# Patient Record
Sex: FEMALE | Race: ASIAN
[De-identification: names, ages, dates, MRNs, and addresses within clinical notes are randomized per-mention and may not be internally consistent; named-entity substitution may affect disease eponyms.]

---

## 2021-06-13 DIAGNOSIS — Z01.818 ENCOUNTER FOR OTHER PREPROCEDURAL EXAMINATION: ICD-10-CM

## 2021-06-13 PROBLEM — Z00.00 ENCOUNTER FOR PREVENTIVE HEALTH EXAMINATION: Status: ACTIVE | Noted: 2021-06-13

## 2021-06-14 ENCOUNTER — APPOINTMENT (OUTPATIENT)
Dept: DISASTER EMERGENCY | Facility: CLINIC | Age: 76
End: 2021-06-14

## 2021-06-15 LAB — SARS-COV-2 N GENE NPH QL NAA+PROBE: NOT DETECTED

## 2021-06-16 RX ORDER — ONDANSETRON 8 MG/1
4 TABLET, FILM COATED ORAL ONCE
Refills: 0 | Status: DISCONTINUED | OUTPATIENT
Start: 2021-06-17 | End: 2021-06-17

## 2021-06-16 RX ORDER — FENTANYL CITRATE 50 UG/ML
25 INJECTION INTRAVENOUS
Refills: 0 | Status: DISCONTINUED | OUTPATIENT
Start: 2021-06-17 | End: 2021-06-17

## 2021-06-16 RX ORDER — SODIUM CHLORIDE 9 MG/ML
1000 INJECTION, SOLUTION INTRAVENOUS
Refills: 0 | Status: DISCONTINUED | OUTPATIENT
Start: 2021-06-17 | End: 2021-06-17

## 2021-06-17 ENCOUNTER — RESULT REVIEW (OUTPATIENT)
Age: 76
End: 2021-06-17

## 2021-06-17 ENCOUNTER — OUTPATIENT (OUTPATIENT)
Dept: INPATIENT UNIT | Facility: HOSPITAL | Age: 76
LOS: 1 days | Discharge: HOME | End: 2021-06-17
Payer: MEDICARE

## 2021-06-17 VITALS
TEMPERATURE: 97 F | SYSTOLIC BLOOD PRESSURE: 157 MMHG | HEIGHT: 62 IN | DIASTOLIC BLOOD PRESSURE: 67 MMHG | WEIGHT: 117.95 LBS | OXYGEN SATURATION: 98 % | RESPIRATION RATE: 16 BRPM | HEART RATE: 64 BPM

## 2021-06-17 VITALS
RESPIRATION RATE: 16 BRPM | DIASTOLIC BLOOD PRESSURE: 68 MMHG | HEART RATE: 56 BPM | SYSTOLIC BLOOD PRESSURE: 154 MMHG | OXYGEN SATURATION: 99 %

## 2021-06-17 DIAGNOSIS — I10 ESSENTIAL (PRIMARY) HYPERTENSION: ICD-10-CM

## 2021-06-17 DIAGNOSIS — E78.5 HYPERLIPIDEMIA, UNSPECIFIED: ICD-10-CM

## 2021-06-17 DIAGNOSIS — E55.9 VITAMIN D DEFICIENCY, UNSPECIFIED: ICD-10-CM

## 2021-06-17 DIAGNOSIS — M81.0 AGE-RELATED OSTEOPOROSIS WITHOUT CURRENT PATHOLOGICAL FRACTURE: ICD-10-CM

## 2021-06-17 DIAGNOSIS — Z90.710 ACQUIRED ABSENCE OF BOTH CERVIX AND UTERUS: Chronic | ICD-10-CM

## 2021-06-17 DIAGNOSIS — Z91.040 LATEX ALLERGY STATUS: ICD-10-CM

## 2021-06-17 DIAGNOSIS — Z98.891 HISTORY OF UTERINE SCAR FROM PREVIOUS SURGERY: Chronic | ICD-10-CM

## 2021-06-17 DIAGNOSIS — C34.11 MALIGNANT NEOPLASM OF UPPER LOBE, RIGHT BRONCHUS OR LUNG: ICD-10-CM

## 2021-06-17 DIAGNOSIS — J47.9 BRONCHIECTASIS, UNCOMPLICATED: ICD-10-CM

## 2021-06-17 DIAGNOSIS — I08.3 COMBINED RHEUMATIC DISORDERS OF MITRAL, AORTIC AND TRICUSPID VALVES: ICD-10-CM

## 2021-06-17 DIAGNOSIS — J42 UNSPECIFIED CHRONIC BRONCHITIS: ICD-10-CM

## 2021-06-17 DIAGNOSIS — Z90.711 ACQUIRED ABSENCE OF UTERUS WITH REMAINING CERVICAL STUMP: ICD-10-CM

## 2021-06-17 DIAGNOSIS — Z98.890 OTHER SPECIFIED POSTPROCEDURAL STATES: Chronic | ICD-10-CM

## 2021-06-17 DIAGNOSIS — Z79.82 LONG TERM (CURRENT) USE OF ASPIRIN: ICD-10-CM

## 2021-06-17 DIAGNOSIS — Z98.49 CATARACT EXTRACTION STATUS, UNSPECIFIED EYE: Chronic | ICD-10-CM

## 2021-06-17 DIAGNOSIS — Z79.899 OTHER LONG TERM (CURRENT) DRUG THERAPY: ICD-10-CM

## 2021-06-17 PROCEDURE — 87102 FUNGUS ISOLATION CULTURE: CPT

## 2021-06-17 PROCEDURE — 88108 CYTOPATH CONCENTRATE TECH: CPT | Mod: 26

## 2021-06-17 PROCEDURE — 88104 CYTOPATH FL NONGYN SMEARS: CPT | Mod: 26,59

## 2021-06-17 PROCEDURE — 87015 SPECIMEN INFECT AGNT CONCNTJ: CPT

## 2021-06-17 PROCEDURE — 87252 VIRUS INOCULATION TISSUE: CPT

## 2021-06-17 PROCEDURE — 89051 BODY FLUID CELL COUNT: CPT

## 2021-06-17 PROCEDURE — 88305 TISSUE EXAM BY PATHOLOGIST: CPT

## 2021-06-17 PROCEDURE — 88104 CYTOPATH FL NONGYN SMEARS: CPT

## 2021-06-17 PROCEDURE — 87070 CULTURE OTHR SPECIMN AEROBIC: CPT

## 2021-06-17 PROCEDURE — C9399: CPT

## 2021-06-17 PROCEDURE — 88108 CYTOPATH CONCENTRATE TECH: CPT

## 2021-06-17 PROCEDURE — 87206 SMEAR FLUORESCENT/ACID STAI: CPT

## 2021-06-17 PROCEDURE — 88305 TISSUE EXAM BY PATHOLOGIST: CPT | Mod: 26

## 2021-06-17 PROCEDURE — 87116 MYCOBACTERIA CULTURE: CPT

## 2021-06-17 RX ORDER — FAMOTIDINE 10 MG/ML
20 INJECTION INTRAVENOUS ONCE
Refills: 0 | Status: COMPLETED | OUTPATIENT
Start: 2021-06-17 | End: 2021-06-17

## 2021-06-17 RX ORDER — ROSUVASTATIN CALCIUM 5 MG/1
1 TABLET ORAL
Qty: 0 | Refills: 0 | DISCHARGE

## 2021-06-17 RX ORDER — ASPIRIN/CALCIUM CARB/MAGNESIUM 324 MG
0 TABLET ORAL
Qty: 0 | Refills: 0 | DISCHARGE

## 2021-06-17 RX ORDER — METOPROLOL TARTRATE 50 MG
1 TABLET ORAL
Qty: 0 | Refills: 0 | DISCHARGE

## 2021-06-17 RX ORDER — METOCLOPRAMIDE HCL 10 MG
10 TABLET ORAL ONCE
Refills: 0 | Status: COMPLETED | OUTPATIENT
Start: 2021-06-17 | End: 2021-06-17

## 2021-06-17 RX ADMIN — Medication 10 MILLIGRAM(S): at 06:50

## 2021-06-17 RX ADMIN — FAMOTIDINE 20 MILLIGRAM(S): 10 INJECTION INTRAVENOUS at 06:50

## 2021-06-17 RX ADMIN — SODIUM CHLORIDE 75 MILLILITER(S): 9 INJECTION, SOLUTION INTRAVENOUS at 08:28

## 2021-06-17 NOTE — BRIEF OPERATIVE NOTE - NSICDXBRIEFPREOP_GEN_ALL_CORE_FT
PRE-OP DIAGNOSIS:  Adenocarcinoma, lung 17-Jun-2021 08:28:15  Jonah Helton  Chronic bronchiolitis 17-Jun-2021 08:28:23  Jonah Helton

## 2021-06-17 NOTE — BRIEF OPERATIVE NOTE - OPERATION/FINDINGS
Bronchoscopy inserted through ETT. Kinsey was sharp. No endbronchial lesions seen within the view of the scope. Left subsegments and right subsegments visualized. BAL performed in the RUL posterior segment with brushing

## 2021-06-17 NOTE — BRIEF OPERATIVE NOTE - NSICDXBRIEFPROCEDURE_GEN_ALL_CORE_FT
PROCEDURES:  Bronchoscopy, with BAL 17-Jun-2021 08:27:49 Brushing was required in the RUL-posterior segment Jonah Helton

## 2021-06-17 NOTE — ASU PATIENT PROFILE, ADULT - PSH
H/O abdominal hysterectomy  2000   H/O abdominal hysterectomy    H/O cataract extraction    bilateral  H/O eye surgery    H/O:  section

## 2022-12-06 ENCOUNTER — OFFICE (OUTPATIENT)
Dept: URBAN - METROPOLITAN AREA CLINIC 94 | Facility: CLINIC | Age: 77
Setting detail: OPHTHALMOLOGY
End: 2022-12-06
Payer: MEDICARE

## 2022-12-06 DIAGNOSIS — H44.23: ICD-10-CM

## 2022-12-06 DIAGNOSIS — H35.3121: ICD-10-CM

## 2022-12-06 DIAGNOSIS — H35.3212: ICD-10-CM

## 2022-12-06 PROCEDURE — 92012 INTRM OPH EXAM EST PATIENT: CPT | Performed by: OPHTHALMOLOGY

## 2022-12-06 PROCEDURE — 92235 FLUORESCEIN ANGRPH MLTIFRAME: CPT | Performed by: OPHTHALMOLOGY

## 2022-12-06 PROCEDURE — 92134 CPTRZ OPH DX IMG PST SGM RTA: CPT | Performed by: OPHTHALMOLOGY

## 2022-12-06 ASSESSMENT — KERATOMETRY
OD_AXISANGLE_DEGREES: 017
OS_AXISANGLE_DEGREES: 166
OD_K1POWER_DIOPTERS: 44.75
OS_K2POWER_DIOPTERS: 45.00
METHOD_AUTO_MANUAL: AUTO
OS_K1POWER_DIOPTERS: 44.50
OD_K2POWER_DIOPTERS: 46.25

## 2022-12-06 ASSESSMENT — PACHYMETRY
OD_CT_CORRECTION: 2
OS_CT_CORRECTION: 1
OD_CT_UM: 514
OS_CT_UM: 535

## 2022-12-06 ASSESSMENT — REFRACTION_AUTOREFRACTION
OD_SPHERE: -0.75
OS_SPHERE: -0.25
OS_CYLINDER: -1.50
OS_AXIS: 086
OD_AXIS: 085
OD_CYLINDER: -0.75

## 2022-12-06 ASSESSMENT — TONOMETRY
OD_IOP_MMHG: 21
OS_IOP_MMHG: 19

## 2022-12-06 ASSESSMENT — CONFRONTATIONAL VISUAL FIELD TEST (CVF)
OS_FINDINGS: FULL
OD_FINDINGS: FULL

## 2022-12-06 ASSESSMENT — AXIALLENGTH_DERIVED
OS_AL: 23.5226
OD_AL: 23.2993

## 2022-12-06 ASSESSMENT — VISUAL ACUITY
OS_BCVA: 20/80-1
OD_BCVA: 20/30-1

## 2022-12-06 ASSESSMENT — SPHEQUIV_DERIVED
OD_SPHEQUIV: -1.125
OS_SPHEQUIV: -1

## 2022-12-21 ENCOUNTER — OFFICE (OUTPATIENT)
Dept: URBAN - METROPOLITAN AREA CLINIC 94 | Facility: CLINIC | Age: 77
Setting detail: OPHTHALMOLOGY
End: 2022-12-21
Payer: MEDICARE

## 2022-12-21 DIAGNOSIS — Z96.1: ICD-10-CM

## 2022-12-21 DIAGNOSIS — H40.013: ICD-10-CM

## 2022-12-21 PROBLEM — H52.7 REFRACTIVE ERROR ; BOTH EYES: Status: ACTIVE | Noted: 2022-12-06

## 2022-12-21 PROCEDURE — 92014 COMPRE OPH EXAM EST PT 1/>: CPT | Performed by: OPHTHALMOLOGY

## 2022-12-21 PROCEDURE — 92250 FUNDUS PHOTOGRAPHY W/I&R: CPT | Performed by: OPHTHALMOLOGY

## 2022-12-21 ASSESSMENT — REFRACTION_AUTOREFRACTION
OD_CYLINDER: -1.50
OS_SPHERE: +0.50
OD_AXIS: 091
OS_AXIS: 087
OS_CYLINDER: -2.25
OD_SPHERE: -0.50

## 2022-12-21 ASSESSMENT — VISUAL ACUITY
OS_BCVA: 20/60-
OD_BCVA: 20/25

## 2022-12-21 ASSESSMENT — KERATOMETRY
OS_K2POWER_DIOPTERS: 45.25
OD_K2POWER_DIOPTERS: 45.75
OS_AXISANGLE_DEGREES: 173
OD_K1POWER_DIOPTERS: 44.50
METHOD_AUTO_MANUAL: AUTO
OD_AXISANGLE_DEGREES: 177
OS_K1POWER_DIOPTERS: 44.00

## 2022-12-21 ASSESSMENT — TONOMETRY
OS_IOP_MMHG: 15
OD_IOP_MMHG: 15

## 2022-12-21 ASSESSMENT — PACHYMETRY
OS_CT_CORRECTION: 1
OD_CT_CORRECTION: 2
OS_CT_UM: 535
OD_CT_UM: 514

## 2022-12-21 ASSESSMENT — SPHEQUIV_DERIVED
OD_SPHEQUIV: -1.25
OS_SPHEQUIV: -0.625

## 2022-12-21 ASSESSMENT — CONFRONTATIONAL VISUAL FIELD TEST (CVF)
OD_FINDINGS: FULL
OS_FINDINGS: FULL

## 2022-12-21 ASSESSMENT — AXIALLENGTH_DERIVED
OS_AL: 23.4234
OD_AL: 23.4825

## 2023-01-27 PROBLEM — H16.223 KERATOCONJUNCTIVITS SICCA; BOTH EYES: Status: ACTIVE | Noted: 2023-01-27

## 2023-07-26 ENCOUNTER — OFFICE (OUTPATIENT)
Dept: URBAN - METROPOLITAN AREA CLINIC 94 | Facility: CLINIC | Age: 78
Setting detail: OPHTHALMOLOGY
End: 2023-07-26
Payer: MEDICARE

## 2023-07-26 DIAGNOSIS — H40.013: ICD-10-CM

## 2023-07-26 DIAGNOSIS — Z96.1: ICD-10-CM

## 2023-07-26 PROBLEM — H40.1111 POAG; RIGHT EYE MILD, LEFT EYE MILD: Status: ACTIVE | Noted: 2023-07-26

## 2023-07-26 PROBLEM — H40.1121 POAG; RIGHT EYE MILD, LEFT EYE MILD: Status: ACTIVE | Noted: 2023-07-26

## 2023-07-26 PROBLEM — H52.7 REFRACTIVE ERROR ; BOTH EYES: Status: ACTIVE | Noted: 2023-07-26

## 2023-07-26 PROCEDURE — 92083 EXTENDED VISUAL FIELD XM: CPT | Performed by: OPHTHALMOLOGY

## 2023-07-26 PROCEDURE — 92133 CPTRZD OPH DX IMG PST SGM ON: CPT | Performed by: OPHTHALMOLOGY

## 2023-07-26 PROCEDURE — 92020 GONIOSCOPY: CPT | Performed by: OPHTHALMOLOGY

## 2023-07-26 PROCEDURE — 92014 COMPRE OPH EXAM EST PT 1/>: CPT | Performed by: OPHTHALMOLOGY

## 2023-07-26 ASSESSMENT — REFRACTION_AUTOREFRACTION
OD_SPHERE: -1.25
OS_AXIS: 086
OS_SPHERE: +0.25
OD_AXIS: 112
OS_CYLINDER: -2.25
OD_CYLINDER: -0.25

## 2023-07-26 ASSESSMENT — TONOMETRY: OS_IOP_MMHG: 15

## 2023-07-26 ASSESSMENT — KERATOMETRY
OS_K2POWER_DIOPTERS: 45.25
OD_K1POWER_DIOPTERS: 45.00
OD_AXISANGLE_DEGREES: 085
OS_K1POWER_DIOPTERS: 44.00
METHOD_AUTO_MANUAL: AUTO
OS_AXISANGLE_DEGREES: 172
OD_K2POWER_DIOPTERS: 46.25

## 2023-07-26 ASSESSMENT — PACHYMETRY
OD_CT_UM: 514
OD_CT_CORRECTION: 2
OS_CT_UM: 535
OS_CT_CORRECTION: 1

## 2023-07-26 ASSESSMENT — CONFRONTATIONAL VISUAL FIELD TEST (CVF)
OS_FINDINGS: FULL
OD_FINDINGS: FULL

## 2023-07-26 ASSESSMENT — SPHEQUIV_DERIVED
OS_SPHEQUIV: -0.875
OD_SPHEQUIV: -1.375

## 2023-07-26 ASSESSMENT — AXIALLENGTH_DERIVED
OS_AL: 23.5198
OD_AL: 23.3496

## 2023-07-26 ASSESSMENT — VISUAL ACUITY
OS_BCVA: 20/80
OD_BCVA: 20/30

## 2023-08-11 ENCOUNTER — ASC (OUTPATIENT)
Dept: URBAN - METROPOLITAN AREA SURGERY 8 | Facility: SURGERY | Age: 78
Setting detail: OPHTHALMOLOGY
End: 2023-08-11
Payer: MEDICARE

## 2023-08-11 DIAGNOSIS — H40.1111: ICD-10-CM

## 2023-08-11 PROCEDURE — 65855 TRABECULOPLASTY LASER SURG: CPT | Performed by: OPHTHALMOLOGY

## 2023-08-11 ASSESSMENT — KERATOMETRY
OD_K1POWER_DIOPTERS: 45.00
OS_K2POWER_DIOPTERS: 45.25
OD_AXISANGLE_DEGREES: 085
METHOD_AUTO_MANUAL: AUTO
OS_AXISANGLE_DEGREES: 172
OS_K1POWER_DIOPTERS: 44.00
OD_K2POWER_DIOPTERS: 46.25

## 2023-08-11 ASSESSMENT — REFRACTION_AUTOREFRACTION
OD_AXIS: 112
OD_CYLINDER: -0.25
OS_SPHERE: +0.25
OS_AXIS: 086
OD_SPHERE: -1.25
OS_CYLINDER: -2.25

## 2023-08-11 ASSESSMENT — SPHEQUIV_DERIVED
OD_SPHEQUIV: -1.375
OS_SPHEQUIV: -0.875

## 2023-08-11 ASSESSMENT — AXIALLENGTH_DERIVED
OD_AL: 23.3496
OS_AL: 23.5198

## 2023-08-11 ASSESSMENT — VISUAL ACUITY
OS_BCVA: 20/80
OD_BCVA: 20/30

## 2023-08-29 ENCOUNTER — OFFICE (OUTPATIENT)
Dept: URBAN - METROPOLITAN AREA CLINIC 94 | Facility: CLINIC | Age: 78
Setting detail: OPHTHALMOLOGY
End: 2023-08-29
Payer: MEDICARE

## 2023-08-29 DIAGNOSIS — H44.23: ICD-10-CM

## 2023-08-29 DIAGNOSIS — H35.3212: ICD-10-CM

## 2023-08-29 DIAGNOSIS — H35.3121: ICD-10-CM

## 2023-08-29 PROCEDURE — 92012 INTRM OPH EXAM EST PATIENT: CPT | Performed by: OPHTHALMOLOGY

## 2023-08-29 PROCEDURE — 92235 FLUORESCEIN ANGRPH MLTIFRAME: CPT | Performed by: OPHTHALMOLOGY

## 2023-08-29 PROCEDURE — 92134 CPTRZ OPH DX IMG PST SGM RTA: CPT | Performed by: OPHTHALMOLOGY

## 2023-08-29 ASSESSMENT — REFRACTION_AUTOREFRACTION
OD_SPHERE: -1.25
OD_CYLINDER: -0.25
OS_CYLINDER: -2.25
OS_SPHERE: +0.25
OD_AXIS: 112
OS_AXIS: 086

## 2023-08-29 ASSESSMENT — PACHYMETRY
OD_CT_CORRECTION: 2
OS_CT_CORRECTION: 1
OS_CT_UM: 535
OD_CT_UM: 514

## 2023-08-29 ASSESSMENT — KERATOMETRY
OS_AXISANGLE_DEGREES: 172
OD_K1POWER_DIOPTERS: 45.00
OS_K1POWER_DIOPTERS: 44.00
METHOD_AUTO_MANUAL: AUTO
OD_K2POWER_DIOPTERS: 46.25
OS_K2POWER_DIOPTERS: 45.25
OD_AXISANGLE_DEGREES: 085

## 2023-08-29 ASSESSMENT — SPHEQUIV_DERIVED
OS_SPHEQUIV: -0.875
OD_SPHEQUIV: -1.375

## 2023-08-29 ASSESSMENT — AXIALLENGTH_DERIVED
OS_AL: 23.5198
OD_AL: 23.3496

## 2023-08-29 ASSESSMENT — TONOMETRY
OD_IOP_MMHG: 18
OS_IOP_MMHG: 20

## 2023-08-29 ASSESSMENT — CONFRONTATIONAL VISUAL FIELD TEST (CVF)
OD_FINDINGS: FULL
OS_FINDINGS: FULL

## 2023-08-29 ASSESSMENT — VISUAL ACUITY
OS_BCVA: 20/100-1
OD_BCVA: 20/30-1

## 2023-09-01 NOTE — ASU PREOP CHECKLIST - TEMPERATURE IN FAHRENHEIT (DEGREES F)
Discharge education provided. Patient verbalizes understanding. Patient A/0x4 and ambulatory to the lobby with a steady gait. Patient discharged home to self care.     97.2

## 2023-09-08 ENCOUNTER — OFFICE (OUTPATIENT)
Dept: URBAN - METROPOLITAN AREA CLINIC 94 | Facility: CLINIC | Age: 78
Setting detail: OPHTHALMOLOGY
End: 2023-09-08
Payer: MEDICARE

## 2023-09-08 DIAGNOSIS — H40.1121: ICD-10-CM

## 2023-09-08 DIAGNOSIS — H40.1111: ICD-10-CM

## 2023-09-08 DIAGNOSIS — Z96.1: ICD-10-CM

## 2023-09-08 PROCEDURE — 92012 INTRM OPH EXAM EST PATIENT: CPT | Performed by: OPHTHALMOLOGY

## 2023-09-08 ASSESSMENT — KERATOMETRY
OD_K1POWER_DIOPTERS: 45.25
OS_AXISANGLE_DEGREES: 166
OD_K2POWER_DIOPTERS: 46.25
OS_K2POWER_DIOPTERS: 45.00
OS_K1POWER_DIOPTERS: 44.00
METHOD_AUTO_MANUAL: AUTO
OD_AXISANGLE_DEGREES: 046

## 2023-09-08 ASSESSMENT — TONOMETRY
OS_IOP_MMHG: 18
OD_IOP_MMHG: 16

## 2023-09-08 ASSESSMENT — CONFRONTATIONAL VISUAL FIELD TEST (CVF)
OD_FINDINGS: FULL
OS_FINDINGS: FULL

## 2023-09-08 ASSESSMENT — SPHEQUIV_DERIVED
OS_SPHEQUIV: -1
OD_SPHEQUIV: -1.625

## 2023-09-08 ASSESSMENT — PACHYMETRY
OS_CT_CORRECTION: 1
OS_CT_UM: 535
OD_CT_CORRECTION: 2
OD_CT_UM: 514

## 2023-09-08 ASSESSMENT — REFRACTION_AUTOREFRACTION
OD_AXIS: 178
OD_SPHERE: -1.50
OS_CYLINDER: -2.00
OD_CYLINDER: -0.25
OS_AXIS: 085
OS_SPHERE: 0.00

## 2023-09-08 ASSESSMENT — VISUAL ACUITY
OD_BCVA: 20/30-1
OS_BCVA: 20/100-1

## 2023-09-08 ASSESSMENT — AXIALLENGTH_DERIVED
OD_AL: 23.4001
OS_AL: 23.6142

## 2023-09-16 NOTE — BRIEF OPERATIVE NOTE - ASSISTANT(S)
Rafale Olivera Kimberly Patient requests all Lab, Cardiology, and Radiology Results on their Discharge Instructions

## 2023-12-05 ENCOUNTER — OFFICE (OUTPATIENT)
Dept: URBAN - METROPOLITAN AREA CLINIC 94 | Facility: CLINIC | Age: 78
Setting detail: OPHTHALMOLOGY
End: 2023-12-05
Payer: MEDICARE

## 2023-12-05 DIAGNOSIS — H40.1121: ICD-10-CM

## 2023-12-05 DIAGNOSIS — H40.1111: ICD-10-CM

## 2023-12-05 DIAGNOSIS — Z96.1: ICD-10-CM

## 2023-12-05 PROCEDURE — 92250 FUNDUS PHOTOGRAPHY W/I&R: CPT | Performed by: OPHTHALMOLOGY

## 2023-12-05 PROCEDURE — 92012 INTRM OPH EXAM EST PATIENT: CPT | Performed by: OPHTHALMOLOGY

## 2023-12-05 ASSESSMENT — REFRACTION_AUTOREFRACTION
OD_CYLINDER: -0.75
OS_CYLINDER: -2.00
OD_AXIS: 091
OS_AXIS: 083
OD_SPHERE: -0.75
OS_SPHERE: -0.25

## 2023-12-05 ASSESSMENT — SPHEQUIV_DERIVED
OS_SPHEQUIV: -1.25
OD_SPHEQUIV: -1.125

## 2024-01-11 ENCOUNTER — OFFICE (OUTPATIENT)
Dept: URBAN - METROPOLITAN AREA CLINIC 94 | Facility: CLINIC | Age: 79
Setting detail: OPHTHALMOLOGY
End: 2024-01-11
Payer: COMMERCIAL

## 2024-01-11 DIAGNOSIS — Z01.00: ICD-10-CM

## 2024-01-11 PROBLEM — H52.4 PRESBYOPIA: Status: ACTIVE | Noted: 2024-01-11

## 2024-01-11 PROCEDURE — 92014 COMPRE OPH EXAM EST PT 1/>: CPT | Performed by: OPTOMETRIST

## 2024-01-11 ASSESSMENT — CONFRONTATIONAL VISUAL FIELD TEST (CVF)
OS_FINDINGS: FULL
OD_FINDINGS: FULL

## 2024-01-11 ASSESSMENT — REFRACTION_CURRENTRX
OD_OVR_VA: 20/
OD_CYLINDER: -1.25
OS_AXIS: 065
OD_AXIS: 110
OS_CYLINDER: -2.00
OD_SPHERE: -0.25
OS_SPHERE: -0.75
OS_OVR_VA: 20/

## 2024-01-11 ASSESSMENT — REFRACTION_MANIFEST
OS_VA1: 20/25
OS_SPHERE: PLANO
OD_VA1: 20/80
OD_ADD: +2.50
OD_CYLINDER: SPH
OD_SPHERE: -1.00
OS_CYLINDER: -1.50
OS_AXIS: 080
OS_ADD: +2.50

## 2024-01-11 ASSESSMENT — REFRACTION_AUTOREFRACTION
OD_CYLINDER: -0.25
OS_AXIS: 080
OS_SPHERE: PLANO
OD_AXIS: 115
OD_SPHERE: -1.00
OS_CYLINDER: -1.75

## 2024-01-11 ASSESSMENT — SPHEQUIV_DERIVED: OD_SPHEQUIV: -1.125

## 2024-04-23 ENCOUNTER — OFFICE (OUTPATIENT)
Dept: URBAN - METROPOLITAN AREA CLINIC 94 | Facility: CLINIC | Age: 79
Setting detail: OPHTHALMOLOGY
End: 2024-04-23
Payer: MEDICARE

## 2024-04-23 DIAGNOSIS — H35.3212: ICD-10-CM

## 2024-04-23 DIAGNOSIS — H35.3121: ICD-10-CM

## 2024-04-23 DIAGNOSIS — H44.23: ICD-10-CM

## 2024-04-23 PROCEDURE — 92235 FLUORESCEIN ANGRPH MLTIFRAME: CPT | Performed by: OPHTHALMOLOGY

## 2024-04-23 PROCEDURE — 92134 CPTRZ OPH DX IMG PST SGM RTA: CPT | Performed by: OPHTHALMOLOGY

## 2024-04-23 PROCEDURE — 92014 COMPRE OPH EXAM EST PT 1/>: CPT | Performed by: OPHTHALMOLOGY

## 2024-04-27 NOTE — ASU DISCHARGE PLAN (ADULT/PEDIATRIC) - ***IN THE EVENT THAT YOU DEVELOP A COMPLICATION AND YOU ARE UNABLE TO REACH YOUR OWN PHYSICIAN, YOU MAY CONTACT:
Use ice 20 minutes at a time several times a day for the first 2 days and then may switch to heat.  Tylenol Motrin as needed for pain.  Follow-up with your family doctor.  Return for any new or worsening symptoms  
Statement Selected

## 2024-05-07 ENCOUNTER — RX ONLY (RX ONLY)
Age: 79
End: 2024-05-07

## 2024-05-07 ENCOUNTER — OFFICE (OUTPATIENT)
Dept: URBAN - METROPOLITAN AREA CLINIC 94 | Facility: CLINIC | Age: 79
Setting detail: OPHTHALMOLOGY
End: 2024-05-07
Payer: MEDICARE

## 2024-05-07 DIAGNOSIS — H40.1121: ICD-10-CM

## 2024-05-07 DIAGNOSIS — H40.1111: ICD-10-CM

## 2024-05-07 PROCEDURE — 92012 INTRM OPH EXAM EST PATIENT: CPT | Performed by: OPHTHALMOLOGY

## 2024-05-07 ASSESSMENT — CONFRONTATIONAL VISUAL FIELD TEST (CVF)
OS_FINDINGS: FULL
OD_FINDINGS: FULL

## 2024-10-23 ENCOUNTER — OFFICE (OUTPATIENT)
Dept: URBAN - METROPOLITAN AREA CLINIC 94 | Facility: CLINIC | Age: 79
Setting detail: OPHTHALMOLOGY
End: 2024-10-23
Payer: MEDICARE

## 2024-10-23 DIAGNOSIS — H44.23: ICD-10-CM

## 2024-10-23 DIAGNOSIS — H35.3212: ICD-10-CM

## 2024-10-23 DIAGNOSIS — H35.3121: ICD-10-CM

## 2024-10-23 DIAGNOSIS — H43.821: ICD-10-CM

## 2024-10-23 PROCEDURE — 92134 CPTRZ OPH DX IMG PST SGM RTA: CPT | Performed by: OPHTHALMOLOGY

## 2024-10-23 PROCEDURE — 92012 INTRM OPH EXAM EST PATIENT: CPT | Performed by: OPHTHALMOLOGY

## 2024-10-23 PROCEDURE — 92235 FLUORESCEIN ANGRPH MLTIFRAME: CPT | Performed by: OPHTHALMOLOGY

## 2024-10-23 ASSESSMENT — VISUAL ACUITY
OS_BCVA: 20/100-1
OD_BCVA: 20/25-1

## 2024-10-23 ASSESSMENT — KERATOMETRY
OS_AXISANGLE_DEGREES: 167
OD_K2POWER_DIOPTERS: 46.00
OS_K2POWER_DIOPTERS: 45.00
METHOD_AUTO_MANUAL: AUTO
OD_AXISANGLE_DEGREES: 065
OS_K1POWER_DIOPTERS: 44.25
OD_K1POWER_DIOPTERS: 45.25

## 2024-10-23 ASSESSMENT — REFRACTION_AUTOREFRACTION
OS_CYLINDER: -1.25
OD_SPHERE: -1.00
OS_SPHERE: 0.00
OD_AXIS: 115
OD_CYLINDER: -0.25
OS_AXIS: 087

## 2024-10-23 ASSESSMENT — CONFRONTATIONAL VISUAL FIELD TEST (CVF)
OS_FINDINGS: FULL
OD_FINDINGS: FULL

## 2024-10-23 ASSESSMENT — TONOMETRY
OD_IOP_MMHG: 18
OS_IOP_MMHG: 17

## 2024-10-23 ASSESSMENT — PACHYMETRY
OD_CT_UM: 514
OS_CT_CORRECTION: 1
OS_CT_UM: 535
OD_CT_CORRECTION: 2

## 2024-11-05 ENCOUNTER — OFFICE (OUTPATIENT)
Dept: URBAN - METROPOLITAN AREA CLINIC 94 | Facility: CLINIC | Age: 79
Setting detail: OPHTHALMOLOGY
End: 2024-11-05
Payer: MEDICARE

## 2024-11-05 DIAGNOSIS — H40.1111: ICD-10-CM

## 2024-11-05 DIAGNOSIS — H40.1121: ICD-10-CM

## 2024-11-05 PROCEDURE — 92014 COMPRE OPH EXAM EST PT 1/>: CPT | Performed by: OPHTHALMOLOGY

## 2024-11-05 PROCEDURE — 92020 GONIOSCOPY: CPT | Performed by: OPHTHALMOLOGY

## 2024-11-05 PROCEDURE — 92133 CPTRZD OPH DX IMG PST SGM ON: CPT | Performed by: OPHTHALMOLOGY

## 2024-11-05 ASSESSMENT — TONOMETRY
OD_IOP_MMHG: 15
OS_IOP_MMHG: 15

## 2024-11-05 ASSESSMENT — KERATOMETRY
METHOD_AUTO_MANUAL: AUTO
OS_K2POWER_DIOPTERS: 44.75
OS_AXISANGLE_DEGREES: 165
OS_K1POWER_DIOPTERS: 44.00
OD_K2POWER_DIOPTERS: 46.25
OD_AXISANGLE_DEGREES: 038
OD_K1POWER_DIOPTERS: 44.00

## 2024-11-05 ASSESSMENT — PACHYMETRY
OD_CT_UM: 514
OS_CT_CORRECTION: 1
OD_CT_CORRECTION: 2
OS_CT_UM: 535

## 2024-11-05 ASSESSMENT — REFRACTION_AUTOREFRACTION
OD_AXIS: 099
OD_CYLINDER: -0.75
OS_CYLINDER: -1.75
OS_SPHERE: +0.50
OS_AXIS: 084
OD_SPHERE: -0.75

## 2024-11-05 ASSESSMENT — VISUAL ACUITY
OS_BCVA: 20/80-2
OD_BCVA: 20/25-1

## 2024-11-05 ASSESSMENT — CONFRONTATIONAL VISUAL FIELD TEST (CVF)
OD_FINDINGS: FULL
OS_FINDINGS: FULL

## 2025-04-07 NOTE — ASU PATIENT PROFILE, ADULT - NS PRO LACT YNNA
Chapis Felix is a 84 year old female presenting to the walk-in clinic today alone for possible allergic reaction after starting new medication last thursday. 5th day taking azithromycin rx  Provider in room, aware of vitals.    Treatment tried prior to visit: None    Swabs/Specimens collected during rooming process:  None    Work, School or  note needed: No    New vs Established: Established    Patient would like communication of their results via:      Cell Phone:   Telephone Information:   Mobile [phone]     Okay to leave a message containing results? Yes    
no

## 2025-04-23 ENCOUNTER — OFFICE (OUTPATIENT)
Dept: URBAN - METROPOLITAN AREA CLINIC 94 | Facility: CLINIC | Age: 80
Setting detail: OPHTHALMOLOGY
End: 2025-04-23
Payer: MEDICARE

## 2025-04-23 DIAGNOSIS — H44.23: ICD-10-CM

## 2025-04-23 DIAGNOSIS — H35.3121: ICD-10-CM

## 2025-04-23 DIAGNOSIS — H35.3212: ICD-10-CM

## 2025-04-23 DIAGNOSIS — H43.821: ICD-10-CM

## 2025-04-23 PROCEDURE — 92134 CPTRZ OPH DX IMG PST SGM RTA: CPT | Performed by: OPHTHALMOLOGY

## 2025-04-23 PROCEDURE — 92014 COMPRE OPH EXAM EST PT 1/>: CPT | Performed by: OPHTHALMOLOGY

## 2025-04-23 ASSESSMENT — KERATOMETRY
OS_K1POWER_DIOPTERS: 44.00
OD_K2POWER_DIOPTERS: 46.25
OD_AXISANGLE_DEGREES: 038
OS_K2POWER_DIOPTERS: 44.75
OS_AXISANGLE_DEGREES: 165
OD_K1POWER_DIOPTERS: 44.00
METHOD_AUTO_MANUAL: AUTO

## 2025-04-23 ASSESSMENT — PACHYMETRY
OS_CT_UM: 535
OD_CT_CORRECTION: 2
OS_CT_CORRECTION: 1
OD_CT_UM: 514

## 2025-04-23 ASSESSMENT — CONFRONTATIONAL VISUAL FIELD TEST (CVF)
OS_FINDINGS: FULL
OD_FINDINGS: FULL

## 2025-04-23 ASSESSMENT — REFRACTION_AUTOREFRACTION
OS_SPHERE: +0.50
OS_AXIS: 084
OS_CYLINDER: -1.75
OD_SPHERE: -0.75
OD_CYLINDER: -0.75
OD_AXIS: 099

## 2025-04-23 ASSESSMENT — VISUAL ACUITY
OS_BCVA: 20/100-1
OD_BCVA: 20/25

## 2025-04-23 ASSESSMENT — TONOMETRY
OD_IOP_MMHG: 21
OS_IOP_MMHG: 20

## 2025-05-07 ENCOUNTER — OFFICE (OUTPATIENT)
Dept: URBAN - METROPOLITAN AREA CLINIC 94 | Facility: CLINIC | Age: 80
Setting detail: OPHTHALMOLOGY
End: 2025-05-07
Payer: MEDICARE

## 2025-05-07 DIAGNOSIS — H40.1121: ICD-10-CM

## 2025-05-07 DIAGNOSIS — H40.1111: ICD-10-CM

## 2025-05-07 DIAGNOSIS — Z96.1: ICD-10-CM

## 2025-05-07 PROCEDURE — 92083 EXTENDED VISUAL FIELD XM: CPT | Performed by: OPHTHALMOLOGY

## 2025-05-07 PROCEDURE — 92250 FUNDUS PHOTOGRAPHY W/I&R: CPT | Performed by: OPHTHALMOLOGY

## 2025-05-07 PROCEDURE — 92014 COMPRE OPH EXAM EST PT 1/>: CPT | Performed by: OPHTHALMOLOGY

## 2025-05-07 ASSESSMENT — VISUAL ACUITY
OS_BCVA: 20/100
OD_BCVA: 20/25

## 2025-05-07 ASSESSMENT — KERATOMETRY
OS_AXISANGLE_DEGREES: 175
OD_AXISANGLE_DEGREES: 050
OD_K2POWER_DIOPTERS: 46.00
OS_K1POWER_DIOPTERS: 44.00
OD_K1POWER_DIOPTERS: 44.75
OS_K2POWER_DIOPTERS: 44.75
METHOD_AUTO_MANUAL: AUTO

## 2025-05-07 ASSESSMENT — REFRACTION_AUTOREFRACTION
OD_CYLINDER: -0.75
OD_SPHERE: -1.25
OS_CYLINDER: -2.00
OS_SPHERE: +0.25
OS_AXIS: 090
OD_AXIS: 120

## 2025-05-07 ASSESSMENT — REFRACTION_CURRENTRX
OS_CYLINDER: -1.50
OD_SPHERE: -1.50
OD_CYLINDER: SPH
OS_VPRISM_DIRECTION: SV
OS_SPHERE: PLANO
OD_VPRISM_DIRECTION: SV
OS_OVR_VA: 20/
OD_OVR_VA: 20/
OS_AXIS: 070

## 2025-05-07 ASSESSMENT — TONOMETRY
OD_IOP_MMHG: 18
OS_IOP_MMHG: 18

## 2025-05-07 ASSESSMENT — PACHYMETRY
OD_CT_CORRECTION: 2
OS_CT_CORRECTION: 1
OD_CT_UM: 514
OS_CT_UM: 535

## 2025-05-07 ASSESSMENT — CONFRONTATIONAL VISUAL FIELD TEST (CVF)
OD_FINDINGS: FULL
OS_FINDINGS: FULL

## 2025-05-29 ENCOUNTER — OFFICE (OUTPATIENT)
Dept: URBAN - METROPOLITAN AREA CLINIC 94 | Facility: CLINIC | Age: 80
Setting detail: OPHTHALMOLOGY
End: 2025-05-29
Payer: MEDICARE

## 2025-05-29 DIAGNOSIS — H40.1111: ICD-10-CM

## 2025-05-29 PROCEDURE — 99213 OFFICE O/P EST LOW 20 MIN: CPT | Performed by: OPTOMETRIST

## 2025-05-29 ASSESSMENT — KERATOMETRY
METHOD_AUTO_MANUAL: AUTO
OD_K2POWER_DIOPTERS: 46.25
OS_K1POWER_DIOPTERS: 43.75
OS_K2POWER_DIOPTERS: 44.75
OD_K1POWER_DIOPTERS: 45.25
OD_AXISANGLE_DEGREES: 050
OS_AXISANGLE_DEGREES: 175

## 2025-05-29 ASSESSMENT — REFRACTION_AUTOREFRACTION
OD_SPHERE: -1.50
OD_CYLINDER: -0.25
OS_SPHERE: +1.00
OS_CYLINDER: -2.25
OS_AXIS: 090
OD_AXIS: 120

## 2025-05-29 ASSESSMENT — REFRACTION_CURRENTRX
OS_AXIS: 070
OS_CYLINDER: -1.50
OS_CYLINDER: -1.50
OS_OVR_VA: 20/
OS_SPHERE: PLANO
OD_OVR_VA: 20/
OD_CYLINDER: SPH
OD_SPHERE: -1.50
OD_CYLINDER: SPH
OS_VPRISM_DIRECTION: SV
OS_VPRISM_DIRECTION: SV
OS_SPHERE: PLANO
OD_VPRISM_DIRECTION: SV
OD_VPRISM_DIRECTION: SV
OD_SPHERE: -1.50
OS_OVR_VA: 20/
OD_OVR_VA: 20/
OS_AXIS: 070

## 2025-05-29 ASSESSMENT — TONOMETRY
OS_IOP_MMHG: 20
OD_IOP_MMHG: 20

## 2025-05-29 ASSESSMENT — PACHYMETRY
OD_CT_UM: 514
OS_CT_UM: 535
OS_CT_CORRECTION: 1
OD_CT_CORRECTION: 2

## 2025-05-29 ASSESSMENT — VISUAL ACUITY
OS_BCVA: 20/100
OD_BCVA: 20/30

## 2025-05-29 ASSESSMENT — REFRACTION_MANIFEST
OS_VA1: 20/25-
OS_SPHERE: -1.00
OD_CYLINDER: -1.50
OS_ADD: +2.50
OD_VA1: 20/100
OS_CYLINDER: SPH
OD_ADD: +2.50
OD_AXIS: 090
OD_SPHERE: +0.50

## 2025-05-29 ASSESSMENT — CONFRONTATIONAL VISUAL FIELD TEST (CVF)
OD_FINDINGS: FULL
OS_FINDINGS: FULL